# Patient Record
Sex: FEMALE | Race: WHITE | Employment: FULL TIME | URBAN - METROPOLITAN AREA
[De-identification: names, ages, dates, MRNs, and addresses within clinical notes are randomized per-mention and may not be internally consistent; named-entity substitution may affect disease eponyms.]

---

## 2017-01-04 DIAGNOSIS — R05.9 COUGH: Primary | ICD-10-CM

## 2017-01-09 DIAGNOSIS — R07.81 RIB PAIN ON LEFT SIDE: Primary | ICD-10-CM

## 2017-03-22 RX ORDER — CIPROFLOXACIN HYDROCHLORIDE 3.5 MG/ML
1 SOLUTION/ DROPS TOPICAL
Qty: 10 ML | Refills: 0 | Status: SHIPPED | OUTPATIENT
Start: 2017-03-22 | End: 2017-04-01

## 2017-05-12 RX ORDER — PREDNISONE 20 MG/1
20 TABLET ORAL
Qty: 30 TAB | Refills: 0 | Status: SHIPPED | OUTPATIENT
Start: 2017-05-12 | End: 2017-12-20 | Stop reason: SDUPTHER

## 2017-05-12 RX ORDER — AZITHROMYCIN 250 MG/1
TABLET, FILM COATED ORAL
Qty: 6 TAB | Refills: 0 | Status: SHIPPED | OUTPATIENT
Start: 2017-05-12 | End: 2017-05-17

## 2017-08-02 RX ORDER — SUMATRIPTAN 100 MG/1
100 TABLET, FILM COATED ORAL
Qty: 10 TAB | Refills: 3 | Status: SHIPPED | OUTPATIENT
Start: 2017-08-02 | End: 2019-04-22 | Stop reason: SDUPTHER

## 2017-08-10 ENCOUNTER — HOSPITAL ENCOUNTER (OUTPATIENT)
Dept: MRI IMAGING | Age: 62
Discharge: HOME | End: 2017-08-10
Attending: OTOLARYNGOLOGY
Payer: COMMERCIAL

## 2017-08-10 VITALS — BODY MASS INDEX: 25.61 KG/M2 | WEIGHT: 140 LBS

## 2017-08-10 DIAGNOSIS — H90.42 SENSORINEURAL HEARING LOSS (SNHL) OF LEFT EAR WITH UNRESTRICTED HEARING OF RIGHT EAR: ICD-10-CM

## 2017-08-10 DIAGNOSIS — R42 DIZZINESS: ICD-10-CM

## 2017-08-10 DIAGNOSIS — Z78.9 NONSMOKER: ICD-10-CM

## 2017-08-10 DIAGNOSIS — H93.12 TINNITUS, LEFT: ICD-10-CM

## 2017-08-10 DIAGNOSIS — J06.9 UPPER RESPIRATORY TRACT INFECTION, UNSPECIFIED TYPE: Primary | ICD-10-CM

## 2017-08-10 PROCEDURE — 74011250636 HC RX REV CODE- 250/636: Performed by: OTOLARYNGOLOGY

## 2017-08-10 PROCEDURE — 70553 MRI BRAIN STEM W/O & W/DYE: CPT

## 2017-08-10 PROCEDURE — A9577 INJ MULTIHANCE: HCPCS | Performed by: OTOLARYNGOLOGY

## 2017-08-10 RX ORDER — AZITHROMYCIN 250 MG/1
TABLET, FILM COATED ORAL
Qty: 6 TAB | Refills: 0 | Status: SHIPPED | OUTPATIENT
Start: 2017-08-10 | End: 2017-08-15

## 2017-08-10 RX ADMIN — GADOBENATE DIMEGLUMINE 13 ML: 529 INJECTION, SOLUTION INTRAVENOUS at 15:34

## 2017-11-13 DIAGNOSIS — J01.00 ACUTE MAXILLARY SINUSITIS, RECURRENCE NOT SPECIFIED: Primary | ICD-10-CM

## 2017-11-13 RX ORDER — AMOXICILLIN 875 MG/1
875 TABLET, FILM COATED ORAL 2 TIMES DAILY
Qty: 20 TAB | Refills: 0 | Status: SHIPPED | OUTPATIENT
Start: 2017-11-13 | End: 2017-11-23

## 2017-12-20 RX ORDER — PREDNISONE 20 MG/1
20 TABLET ORAL
Qty: 30 TAB | Refills: 0 | Status: SHIPPED | OUTPATIENT
Start: 2017-12-20 | End: 2018-10-01 | Stop reason: ALTCHOICE

## 2018-02-07 ENCOUNTER — HOSPITAL ENCOUNTER (OUTPATIENT)
Dept: GENERAL RADIOLOGY | Age: 63
Discharge: HOME | End: 2018-02-07
Payer: COMMERCIAL

## 2018-02-07 DIAGNOSIS — R05.9 COUGH: ICD-10-CM

## 2018-02-07 PROCEDURE — 71046 X-RAY EXAM CHEST 2 VIEWS: CPT

## 2018-05-21 ENCOUNTER — TELEPHONE (OUTPATIENT)
Dept: FAMILY MEDICINE CLINIC | Age: 63
End: 2018-05-21

## 2018-05-21 DIAGNOSIS — M54.9 UPPER BACK PAIN: Primary | ICD-10-CM

## 2018-05-21 RX ORDER — LIDOCAINE 50 MG/G
PATCH TOPICAL
Qty: 30 EACH | Refills: 2 | Status: SHIPPED | OUTPATIENT
Start: 2018-05-21 | End: 2019-04-22 | Stop reason: ALTCHOICE

## 2018-05-21 NOTE — TELEPHONE ENCOUNTER
PA initiated for Lidocaine Patch, case ID [id number]. Lidocaine was denied coverage, rationale letter put in in-basket.

## 2018-09-24 ENCOUNTER — APPOINTMENT (OUTPATIENT)
Dept: CT IMAGING | Age: 63
End: 2018-09-24
Attending: PHYSICIAN ASSISTANT
Payer: OTHER MISCELLANEOUS

## 2018-09-24 ENCOUNTER — HOSPITAL ENCOUNTER (EMERGENCY)
Age: 63
Discharge: HOME | End: 2018-09-24
Attending: EMERGENCY MEDICINE | Admitting: EMERGENCY MEDICINE
Payer: OTHER MISCELLANEOUS

## 2018-09-24 VITALS
HEART RATE: 78 BPM | WEIGHT: 140 LBS | SYSTOLIC BLOOD PRESSURE: 140 MMHG | BODY MASS INDEX: 25.76 KG/M2 | OXYGEN SATURATION: 97 % | RESPIRATION RATE: 16 BRPM | DIASTOLIC BLOOD PRESSURE: 68 MMHG | TEMPERATURE: 98.2 F | HEIGHT: 62 IN

## 2018-09-24 DIAGNOSIS — S01.112A LACERATION OF LEFT EYEBROW, INITIAL ENCOUNTER: ICD-10-CM
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
## 2018-09-24 PROCEDURE — 90715 TDAP VACCINE 7 YRS/> IM: CPT | Performed by: PHYSICIAN ASSISTANT

## 2018-09-24 PROCEDURE — 77030018836 HC SOL IRR NACL ICUM -A

## 2018-09-24 PROCEDURE — 75810000293 HC SIMP/SUPERF WND  RPR

## 2018-09-24 PROCEDURE — 74011250637 HC RX REV CODE- 250/637: Performed by: PHYSICIAN ASSISTANT

## 2018-09-24 PROCEDURE — 70486 CT MAXILLOFACIAL W/O DYE: CPT

## 2018-09-24 PROCEDURE — 74011250636 HC RX REV CODE- 250/636: Performed by: PHYSICIAN ASSISTANT

## 2018-09-24 PROCEDURE — 99283 EMERGENCY DEPT VISIT LOW MDM: CPT

## 2018-09-24 PROCEDURE — 77030039266 HC ADH SKN EXOFIN S2SG -A

## 2018-09-24 PROCEDURE — 70450 CT HEAD/BRAIN W/O DYE: CPT

## 2018-09-24 PROCEDURE — 90471 IMMUNIZATION ADMIN: CPT

## 2018-09-24 RX ORDER — HYDROCODONE BITARTRATE AND ACETAMINOPHEN 5; 325 MG/1; MG/1
1 TABLET ORAL
Status: COMPLETED | OUTPATIENT
Start: 2018-09-24 | End: 2018-09-24

## 2018-09-24 RX ADMIN — HYDROCODONE BITARTRATE AND ACETAMINOPHEN 1 TABLET: 5; 325 TABLET ORAL at 19:40

## 2018-09-24 RX ADMIN — TETANUS TOXOID, REDUCED DIPHTHERIA TOXOID AND ACELLULAR PERTUSSIS VACCINE, ADSORBED 0.5 ML: 5; 2.5; 8; 8; 2.5 SUSPENSION INTRAMUSCULAR at 19:40

## 2018-09-24 NOTE — ED NOTES
Patient has been medically cleared for discharge at this time. She was given all discharge instructions by Bree Hankins. Patient verbalized understanding of all education. Seen leaving the department with a steady gait and all belongings.

## 2018-09-24 NOTE — DISCHARGE INSTRUCTIONS
Cuts Closed With Adhesives: Care Instructions  Your Care Instructions  A cut can happen anywhere on your body. The doctor used an adhesive to close the cut. When the adhesive dries, it forms a film that holds the edges of the cut together. Skin adhesives are sometimes called liquid stitches. If the cut went deep and through the skin, the doctor may have put in a layer of stitches below the adhesive. The deeper layer of stitches brings the deep part of the cut together. These stitches will dissolve and don't need to be removed. You don't see the stitches, only the adhesive. You may have a bandage. The doctor has checked you carefully, but problems can develop later. If you notice any problems or new symptoms, get medical treatment right away. Follow-up care is a key part of your treatment and safety. Be sure to make and go to all appointments, and call your doctor if you are having problems. It's also a good idea to know your test results and keep a list of the medicines you take. How can you care for yourself at home? · Keep the cut dry for the first 24 to 48 hours. After this, you can shower if your doctor okays it. Pat the cut dry. · Don't soak the cut, such as in a bathtub. Your doctor will tell you when it's safe to get the cut wet. · If your doctor told you how to care for your cut, follow your doctor's instructions. If you did not get instructions, follow this general advice:  ¨ Do not put any kind of ointment, cream, or lotion over the area. This can make the adhesive fall off too soon. ¨ After the first 24 to 48 hours, wash around the cut with clean water 2 times a day. Do not use hydrogen peroxide or alcohol, which can slow healing. ¨ If the doctor told you to use a bandage, put on a new bandage after cleaning the cut or if the bandage gets wet or dirty. · Prop up the sore area on a pillow anytime you sit or lie down during the next 3 days. Try to keep it above the level of your heart. This will help reduce swelling. · Leave the skin adhesive on your skin until it falls off on its own. This may take 5 to 10 days. · Do not scratch, rub, or pick at the adhesive. · Do not put the sticky part of a bandage directly on the adhesive. · Avoid any activity that could cause your cut to reopen. · Be safe with medicines. Read and follow all instructions on the label. ¨ If the doctor gave you a prescription medicine for pain, take it as prescribed. ¨ If you are not taking a prescription pain medicine, ask your doctor if you can take an over-the-counter medicine. When should you call for help? Call your doctor now or seek immediate medical care if:    · You have new pain, or your pain gets worse.     · The skin near the cut is cold or pale or changes color.     · You have tingling, weakness, or numbness near the cut.     · The cut starts to bleed.     · You have trouble moving the area near the cut.     · You have symptoms of infection, such as:  ¨ Increased pain, swelling, warmth, or redness around the cut. ¨ Red streaks leading from the cut. ¨ Pus draining from the cut. ¨ A fever.    Watch closely for changes in your health, and be sure to contact your doctor if:    · The cut reopens.     · You do not get better as expected. Where can you learn more? Go to http://nickolas-divya.info/. Enter P174 in the search box to learn more about \"Cuts Closed With Adhesives: Care Instructions. \"  Current as of: November 20, 2017  Content Version: 11.7  © 6343-6749 [employer]. Care instructions adapted under license by [employer] (which disclaims liability or warranty for this information). If you have questions about a medical condition or this instruction, always ask your healthcare professional. [address] any warranty or liability for your use of this information.

## 2018-09-24 NOTE — ED PROVIDER NOTES
HPI Comments: 57 y.o. female with past medical history significant for asthma who presents from work with chief complaint of facial pain and swelling s/p GLF. The patient states she tripped and fell this afternoon onto a tile floor, sustained a laceration to her left eyebrow. The patient reports left sided facial pain, swelling, and bruising since, as well as some left hand pain from catching herself. She states her coworkers helped her up. The patient states her last tetanus shot was in . She denies any anticoagulant use. She denies any LOC, neck pain, back pain, fever, chest pain, SOB, nausea, vomiting, weakness, or numbness. There are no other acute medical concerns at this time. Social hx: Nonsmoker; No EtOH use PCP: Jamaica Ramos NP Note written by Moriah Capellan, as dictated by Kiran Rivera PA-C 6:45 PM 
 
The history is provided by the patient. No  was used. Past Medical History:  
Diagnosis Date  Asthma  Triad asthma 2009 Past Surgical History:  
Procedure Laterality Date  [facility]   HX HEENT    
 nasal polyp  HX ORTHOPAEDIC    
 back surgery X3 Family History:  
Problem Relation Age of Onset  Asthma Father  Hypertension Father Social History Social History  Marital status:  Spouse name: N/A  
 Number of children: N/A  
 Years of education: N/A Occupational History  Not on file. Social History Main Topics  Smoking status: Never Smoker  Smokeless tobacco: Never Used  Alcohol use No  
 Drug use: No  
 Sexual activity: Not on file Comment:  Other Topics Concern  Not on file Social History Narrative ALLERGIES: Aspirin; Nsaids (non-steroidal anti-inflammatory drug); and Singulair [montelukast] Review of Systems Constitutional: Negative for chills and fever. HENT: Positive for facial swelling. (+) Facial pain Eyes: Negative for visual disturbance. Respiratory: Negative for shortness of breath. Cardiovascular: Negative for chest pain. Gastrointestinal: Negative for nausea and vomiting. Musculoskeletal: Negative for back pain and neck pain. Skin: Positive for color change and wound. Neurological: Negative for syncope, weakness, light-headedness and numbness. All other systems reviewed and are negative. Vitals:  
 09/24/18 1749 BP: 153/78 Pulse: 93 Resp: 16 Temp: 98.3 °F (36.8 °C) SpO2: 98% Weight: 63.5 kg (140 lb) Height: 5' 2\" (1.575 m) Physical Exam  
Constitutional: She is oriented to person, place, and time. She appears well-developed and well-nourished. No distress. HENT:  
Head: Normocephalic. Left eyebrow with superificial laceration approx 2 cm long, skin edges well approximated, no active bleeding, with underlying contusion. Left maxillary face with swelling, mild ecchymosis, and tenderness to palpation. No nasal or frontal tenderness. No malocclusion of jaw. Eyes: Conjunctivae and EOM are normal. Pupils are equal, round, and reactive to light. Neck: Normal range of motion. Neck supple. Cardiovascular: Normal rate. Pulmonary/Chest: Effort normal. No respiratory distress. Musculoskeletal: No midline tenderness of cervical, thoracic or lumbar spine. Left hand with mild tenderness of thenar eminence, no bony tenderness. No ecchymosis or deformity. Fingers are warm with intact light touch sensation, full active ROM. Neurological: She is alert and oriented to person, place, and time. CN 2-12 intact. 5/5 strength of bilateral upper and lower extremities, no sensory deficits. Ambulatory with steady gait. Skin: Skin is warm and dry. She is not diaphoretic. Nursing note and vitals reviewed. MDM Number of Diagnoses or Management Options Fall, initial encounter:  
Laceration of left eyebrow, initial encounter: Amount and/or Complexity of Data Reviewed Tests in the radiology section of CPT®: ordered and reviewed Patient Progress Patient progress: stable ED Course Wound Closure by Adhesive Date/Time: 9/24/2018 7:39 PM 
Performed by: John Barba Authorized by: John Barba Consent:  
  Consent obtained:  Verbal 
  Consent given by:  Patient Laceration details: Location:  Face Face location:  L eyebrow Length (cm):  2 Laceration depth: superficial. 
Repair type:  
  Repair type:  Simple Exploration:  
  Hemostasis achieved with:  Direct pressure Contaminated: no   
Treatment:  
  Area cleansed with:  Saline Amount of cleaning:  Standard Irrigation solution:  Sterile saline Skin repair:  
  Repair method:  Tissue adhesive Approximation:  
  Approximation:  Loose Post-procedure details:  
  Dressing:  Open (no dressing) Patient tolerance of procedure: Tolerated well, no immediate complications 57 y.o. female with past medical history significant for asthma who presents from work with chief complaint of facial pain and swelling s/p GLF. CT head and CT maxillofacial obtained and are negative for acute fractures or intracranial injuries. Low clinical suspicion for fractures/malalignment of left hand. Laceration repair performed, see procedure note above for details. Tdap administered. Safe for discharge home. PCP follow up as needed. Strict ED return precautions discussed and provided in writing at time of discharge. The patient verbalized understanding and agreement with this plan.

## 2018-09-24 NOTE — ED TRIAGE NOTES
Triage note: Pt arrives with c/o fall today. Pt tripped and hit her face. Laceration above left eyebrow and swelling to leg cheek. Denies LOC.

## 2018-10-01 ENCOUNTER — OFFICE VISIT (OUTPATIENT)
Dept: FAMILY MEDICINE CLINIC | Age: 63
End: 2018-10-01

## 2018-10-01 VITALS
OXYGEN SATURATION: 98 % | WEIGHT: 144.6 LBS | DIASTOLIC BLOOD PRESSURE: 61 MMHG | TEMPERATURE: 99.6 F | RESPIRATION RATE: 20 BRPM | HEART RATE: 82 BPM | BODY MASS INDEX: 26.61 KG/M2 | SYSTOLIC BLOOD PRESSURE: 126 MMHG | HEIGHT: 62 IN

## 2018-10-01 DIAGNOSIS — R07.81 RIB PAIN: Primary | ICD-10-CM

## 2018-10-01 RX ORDER — TIMOLOL MALEATE 2.5 MG/ML
1 SOLUTION/ DROPS OPHTHALMIC 2 TIMES DAILY
COMMUNITY

## 2018-10-01 RX ORDER — BUDESONIDE AND FORMOTEROL FUMARATE DIHYDRATE 160; 4.5 UG/1; UG/1
AEROSOL RESPIRATORY (INHALATION)
Refills: 12 | COMMUNITY
Start: 2018-09-18 | End: 2020-01-27 | Stop reason: SDUPTHER

## 2018-10-09 NOTE — PROGRESS NOTES
HISTORY OF PRESENT ILLNESS Kenyatta Brown is a 57 y.o. female. HPI This lady presents to the clinic for follow up of a fall. She was going to work on 9/24/18 when she tripped and landed face down. She was subsequently evaluated in the emergency room, where imaging studies were apparently normal.' She states she is feeling better and has actually returned to work. She states she has occasional pain in her right rib and wanted consideration of an xray to be sure she doesn't have any rib fractures. She denies CP or SOB Review of Systems Constitutional: Negative for chills and fever. Respiratory: Negative for cough, shortness of breath and wheezing. Cardiovascular: Negative for chest pain and palpitations. Gastrointestinal: Negative for abdominal pain. Musculoskeletal: Negative for myalgias. Physical Exam  
Constitutional: She appears well-developed and well-nourished. No distress. HENT:  
Right Ear: External ear normal.  
Left Ear: External ear normal.  
Nose: Nose normal.  
Mouth/Throat: Oropharynx is clear and moist. No oropharyngeal exudate. Left facial contusion-abrasions to face, healing Eyes: Pupils are equal, round, and reactive to light. Neck: Normal range of motion. Cardiovascular: Normal rate, regular rhythm and normal heart sounds. No murmur heard. Pulmonary/Chest: Effort normal and breath sounds normal. No respiratory distress. She has no wheezes. She has no rales. Abdominal: There is no tenderness. Skin: She is not diaphoretic. ASSESSMENT and PLAN 
  ICD-10-CM ICD-9-CM 1. Rib pain R07.81 786.50 XR RIBS RT UNI 2 V  
will get xray of right rib detail Continue current care Follow up 1 week

## 2019-01-08 DIAGNOSIS — J33.9 TRIAD ASTHMA: Primary | ICD-10-CM

## 2019-01-08 DIAGNOSIS — J45.909 TRIAD ASTHMA: Primary | ICD-10-CM

## 2019-01-08 DIAGNOSIS — Z88.6 TRIAD ASTHMA: Primary | ICD-10-CM

## 2019-01-08 RX ORDER — ALBUTEROL SULFATE 90 UG/1
2 AEROSOL, METERED RESPIRATORY (INHALATION)
Qty: 3 INHALER | Refills: 1 | Status: SHIPPED | OUTPATIENT
Start: 2019-01-08 | End: 2020-03-16

## 2019-02-11 DIAGNOSIS — B02.9 HERPES ZOSTER WITHOUT COMPLICATION: Primary | ICD-10-CM

## 2019-02-11 RX ORDER — VALACYCLOVIR HYDROCHLORIDE 1 G/1
1000 TABLET, FILM COATED ORAL 3 TIMES DAILY
Qty: 21 TAB | Refills: 0 | Status: SHIPPED | OUTPATIENT
Start: 2019-02-11 | End: 2022-04-24

## 2019-04-08 DIAGNOSIS — J06.9 UPPER RESPIRATORY TRACT INFECTION, UNSPECIFIED TYPE: Primary | ICD-10-CM

## 2019-04-08 DIAGNOSIS — J33.9 TRIAD ASTHMA: ICD-10-CM

## 2019-04-08 DIAGNOSIS — J45.909 TRIAD ASTHMA: ICD-10-CM

## 2019-04-08 DIAGNOSIS — Z88.6 TRIAD ASTHMA: ICD-10-CM

## 2019-04-08 RX ORDER — PREDNISONE 20 MG/1
TABLET ORAL
Qty: 30 TAB | Refills: 0 | Status: SHIPPED | OUTPATIENT
Start: 2019-04-08 | End: 2019-04-22 | Stop reason: ALTCHOICE

## 2019-04-08 RX ORDER — AZITHROMYCIN 250 MG/1
TABLET, FILM COATED ORAL
Qty: 6 TAB | Refills: 0 | Status: SHIPPED | OUTPATIENT
Start: 2019-04-08 | End: 2019-04-13

## 2019-04-22 ENCOUNTER — OFFICE VISIT (OUTPATIENT)
Dept: FAMILY MEDICINE CLINIC | Age: 64
End: 2019-04-22

## 2019-04-22 VITALS
BODY MASS INDEX: 24.84 KG/M2 | WEIGHT: 135 LBS | OXYGEN SATURATION: 99 % | TEMPERATURE: 98.3 F | DIASTOLIC BLOOD PRESSURE: 62 MMHG | HEART RATE: 76 BPM | HEIGHT: 62 IN | RESPIRATION RATE: 16 BRPM | SYSTOLIC BLOOD PRESSURE: 138 MMHG

## 2019-04-22 DIAGNOSIS — J33.9 TRIAD ASTHMA: ICD-10-CM

## 2019-04-22 DIAGNOSIS — E55.9 VITAMIN D DEFICIENCY: ICD-10-CM

## 2019-04-22 DIAGNOSIS — Z88.6 TRIAD ASTHMA: ICD-10-CM

## 2019-04-22 DIAGNOSIS — Z00.00 ROUTINE GENERAL MEDICAL EXAMINATION AT A HEALTH CARE FACILITY: Primary | ICD-10-CM

## 2019-04-22 DIAGNOSIS — G43.009 MIGRAINE WITHOUT AURA AND WITHOUT STATUS MIGRAINOSUS, NOT INTRACTABLE: ICD-10-CM

## 2019-04-22 DIAGNOSIS — J45.909 TRIAD ASTHMA: ICD-10-CM

## 2019-04-22 RX ORDER — SUMATRIPTAN 100 MG/1
100 TABLET, FILM COATED ORAL
Qty: 10 TAB | Refills: 3 | Status: SHIPPED | OUTPATIENT
Start: 2019-04-22 | End: 2019-04-22

## 2019-04-22 NOTE — PROGRESS NOTES
HISTORY OF PRESENT ILLNESS  Sheeba Hernandez is a 70 y.o. female. HPI  Presents for CPE. Follows healthy diet, inconsistent with regular exercise. History of asthma. Using inhalers as prescribed. History of migraine headaches. Using imitrex occasionally with good sx relief. Declines screening colonoscopy. Past Medical History:   Diagnosis Date    Asthma     Triad asthma 2009     Patient Active Problem List   Diagnosis Code    Triad asthma J45.909, J33.9, Z88.6    Migraine without aura and without status migrainosus, not intractable G43.009     Past Surgical History:   Procedure Laterality Date    HX GYN  1984        HX HEENT      nasal polyp    HX ORTHOPAEDIC      back surgery X3     Current Outpatient Medications   Medication Sig    SUMAtriptan (IMITREX) 100 mg tablet Take 1 Tab by mouth once as needed for Migraine for up to 1 dose.  albuterol (PROVENTIL HFA, VENTOLIN HFA, PROAIR HFA) 90 mcg/actuation inhaler Take 2 Puffs by inhalation every four (4) hours as needed for Wheezing.  SYMBICORT 160-4.5 mcg/actuation HFAA INHALE 2 PUFFS BY MOUTH TWICE A DAY (RINSE MOUTH AFTER USE)    timolol (TIMOPTIC) 0.25 % ophthalmic solution Administer 1 Drop to both eyes two (2) times a day.  valACYclovir (VALTREX) 1 gram tablet Take 1 Tab by mouth three (3) times daily.  celecoxib (CELEBREX) 200 mg capsule Take 1 Cap by mouth two (2) times a day. No current facility-administered medications for this visit. Social History     Tobacco Use    Smoking status: Never Smoker    Smokeless tobacco: Never Used   Substance Use Topics    Alcohol use: No    Drug use: No     Visit Vitals  /62   Pulse 76   Temp 98.3 °F (36.8 °C) (Oral)   Resp 16   Ht 5' 2\" (1.575 m)   Wt 135 lb (61.2 kg)   SpO2 99%   BMI 24.69 kg/m²     Review of Systems   Constitutional: Negative. Respiratory: Negative for cough and shortness of breath.     Cardiovascular: Negative for chest pain and palpitations. Neurological: Positive for headaches (occasional). All other systems reviewed and are negative. Physical Exam   Constitutional: No distress. Neck: Neck supple. Cardiovascular: Normal rate, regular rhythm and normal heart sounds. Pulmonary/Chest: Effort normal and breath sounds normal.   Abdominal: Soft. She exhibits no distension. There is no tenderness. There is no guarding. Musculoskeletal: She exhibits no edema. Lymphadenopathy:     She has no cervical adenopathy. Neurological: She is alert. No cranial nerve deficit. Skin: Skin is warm and dry. Psychiatric: She has a normal mood and affect. ASSESSMENT and PLAN  Diagnoses and all orders for this visit:    1. Routine general medical examination at a health care facility  -     CBC W/O DIFF  -     METABOLIC PANEL, COMPREHENSIVE  -     LIPID PANEL  -     TSH 3RD GENERATION  -     MS HANDLG&/OR CONVEY OF SPEC FOR TR OFFICE TO LAB  -     COLLECTION VENOUS BLOOD,VENIPUNCTURE  Age appropriate health maintenance and prevention reviewed. Follow healthy diet and exercise regularly at least 4-5x weekly. She will return for fasting labs. 2. Migraine without aura and without status migrainosus, not intractable  -     SUMAtriptan (IMITREX) 100 mg tablet; Take 1 Tab by mouth once as needed for Migraine for up to 1 dose. Stable on prn imitrex. Medication risks, benefits and potential side effects were reviewed. 3. Triad asthma  Stable on current treatment. Follow up 1 year or sooner prn.

## 2019-04-22 NOTE — PROGRESS NOTES
Chief Complaint   Patient presents with    Complete Physical     Yearly Physical.     1. Have you been to the ER, urgent care clinic since your last visit? Hospitalized since your last visit? No    2. Have you seen or consulted any other health care providers outside of the [address] [place] [address] since your last visit? Include any pap smears or colon screening.  No

## 2019-04-29 DIAGNOSIS — Z00.00 ROUTINE GENERAL MEDICAL EXAMINATION AT A HEALTH CARE FACILITY: Primary | ICD-10-CM

## 2019-05-05 DIAGNOSIS — J33.9 TRIAD ASTHMA: ICD-10-CM

## 2019-05-05 DIAGNOSIS — Z88.6 TRIAD ASTHMA: ICD-10-CM

## 2019-05-05 DIAGNOSIS — J45.909 TRIAD ASTHMA: ICD-10-CM

## 2019-05-06 RX ORDER — PREDNISONE 20 MG/1
TABLET ORAL
Qty: 30 TAB | Refills: 0 | Status: SHIPPED | OUTPATIENT
Start: 2019-05-06 | End: 2022-04-24

## 2019-09-04 LAB
25(OH)D3+25(OH)D2 SERPL-MCNC: 11.2 NG/ML (ref 30–100)
ALBUMIN SERPL-MCNC: 4.5 G/DL (ref 3.6–4.8)
ALBUMIN/GLOB SERPL: 1.8 {RATIO} (ref 1.2–2.2)
ALP SERPL-CCNC: 90 IU/L (ref 39–117)
ALT SERPL-CCNC: 25 IU/L (ref 0–32)
AST SERPL-CCNC: 22 IU/L (ref 0–40)
BILIRUB SERPL-MCNC: 0.3 MG/DL (ref 0–1.2)
BUN SERPL-MCNC: 12 MG/DL (ref 8–27)
BUN/CREAT SERPL: 23 (ref 12–28)
CALCIUM SERPL-MCNC: 9.8 MG/DL (ref 8.7–10.3)
CHLORIDE SERPL-SCNC: 103 MMOL/L (ref 96–106)
CHOLEST SERPL-MCNC: 190 MG/DL (ref 100–199)
CO2 SERPL-SCNC: 24 MMOL/L (ref 20–29)
CREAT SERPL-MCNC: 0.53 MG/DL (ref 0.57–1)
ERYTHROCYTE [DISTWIDTH] IN BLOOD BY AUTOMATED COUNT: 14.6 % (ref 12.3–15.4)
GLOBULIN SER CALC-MCNC: 2.5 G/DL (ref 1.5–4.5)
GLUCOSE SERPL-MCNC: 99 MG/DL (ref 65–99)
HCT VFR BLD AUTO: 42.7 % (ref 34–46.6)
HDLC SERPL-MCNC: 50 MG/DL
HGB BLD-MCNC: 13.8 G/DL (ref 11.1–15.9)
INTERPRETATION, 910389: NORMAL
LDLC SERPL CALC-MCNC: 124 MG/DL (ref 0–99)
MCH RBC QN AUTO: 26.4 PG (ref 26.6–33)
MCHC RBC AUTO-ENTMCNC: 32.3 G/DL (ref 31.5–35.7)
MCV RBC AUTO: 82 FL (ref 79–97)
PLATELET # BLD AUTO: 370 X10E3/UL (ref 150–450)
POTASSIUM SERPL-SCNC: 5.1 MMOL/L (ref 3.5–5.2)
PROT SERPL-MCNC: 7 G/DL (ref 6–8.5)
RBC # BLD AUTO: 5.22 X10E6/UL (ref 3.77–5.28)
SODIUM SERPL-SCNC: 143 MMOL/L (ref 134–144)
TRIGL SERPL-MCNC: 81 MG/DL (ref 0–149)
TSH SERPL DL<=0.005 MIU/L-ACNC: 2.87 UIU/ML (ref 0.45–4.5)
VIT B12 SERPL-MCNC: 1350 PG/ML (ref 232–1245)
VLDLC SERPL CALC-MCNC: 16 MG/DL (ref 5–40)
WBC # BLD AUTO: 7.9 X10E3/UL (ref 3.4–10.8)

## 2020-01-27 DIAGNOSIS — Z88.6 TRIAD ASTHMA: Primary | ICD-10-CM

## 2020-01-27 DIAGNOSIS — J45.909 TRIAD ASTHMA: Primary | ICD-10-CM

## 2020-01-27 DIAGNOSIS — J33.9 TRIAD ASTHMA: Primary | ICD-10-CM

## 2020-01-27 RX ORDER — BUDESONIDE AND FORMOTEROL FUMARATE DIHYDRATE 160; 4.5 UG/1; UG/1
AEROSOL RESPIRATORY (INHALATION)
Qty: 3 INHALER | Refills: 4 | Status: SHIPPED | OUTPATIENT
Start: 2020-01-27 | End: 2020-12-07 | Stop reason: SDUPTHER

## 2020-02-13 DIAGNOSIS — J06.9 UPPER RESPIRATORY TRACT INFECTION, UNSPECIFIED TYPE: Primary | ICD-10-CM

## 2020-02-13 RX ORDER — AZITHROMYCIN 250 MG/1
TABLET, FILM COATED ORAL
Qty: 6 TAB | Refills: 0 | Status: SHIPPED | OUTPATIENT
Start: 2020-02-13 | End: 2020-02-18

## 2020-03-14 DIAGNOSIS — Z88.6 TRIAD ASTHMA: ICD-10-CM

## 2020-03-14 DIAGNOSIS — J45.909 TRIAD ASTHMA: ICD-10-CM

## 2020-03-14 DIAGNOSIS — J33.9 TRIAD ASTHMA: ICD-10-CM

## 2020-03-16 RX ORDER — ALBUTEROL SULFATE 90 UG/1
AEROSOL, METERED RESPIRATORY (INHALATION)
Qty: 8.5 INHALER | Refills: 5 | Status: SHIPPED | OUTPATIENT
Start: 2020-03-16 | End: 2020-04-27 | Stop reason: SDUPTHER

## 2020-04-03 DIAGNOSIS — J06.9 UPPER RESPIRATORY TRACT INFECTION, UNSPECIFIED TYPE: Primary | ICD-10-CM

## 2020-04-03 RX ORDER — AZITHROMYCIN 250 MG/1
TABLET, FILM COATED ORAL
Qty: 6 TAB | Refills: 0 | Status: SHIPPED | OUTPATIENT
Start: 2020-04-03 | End: 2020-04-08

## 2020-04-27 DIAGNOSIS — J45.909 TRIAD ASTHMA: ICD-10-CM

## 2020-04-27 DIAGNOSIS — J33.9 TRIAD ASTHMA: ICD-10-CM

## 2020-04-27 DIAGNOSIS — Z88.6 TRIAD ASTHMA: ICD-10-CM

## 2020-04-27 DIAGNOSIS — M54.2 NECK PAIN: ICD-10-CM

## 2020-04-27 RX ORDER — ALBUTEROL SULFATE 90 UG/1
AEROSOL, METERED RESPIRATORY (INHALATION)
Qty: 3 INHALER | Refills: 1 | Status: SHIPPED | OUTPATIENT
Start: 2020-04-27

## 2020-04-27 RX ORDER — CELECOXIB 200 MG/1
200 CAPSULE ORAL 2 TIMES DAILY
Qty: 30 CAP | Refills: 1 | Status: SHIPPED | OUTPATIENT
Start: 2020-04-27 | End: 2020-05-26

## 2020-05-07 DIAGNOSIS — Z20.828 SARS-ASSOCIATED CORONAVIRUS EXPOSURE: Primary | ICD-10-CM

## 2020-05-12 LAB — SARS-COV-2 AB, IGG, CORG1M: NEGATIVE

## 2020-05-23 DIAGNOSIS — M54.2 NECK PAIN: ICD-10-CM

## 2020-05-26 RX ORDER — CELECOXIB 200 MG/1
CAPSULE ORAL
Qty: 30 CAP | Refills: 1 | Status: SHIPPED | OUTPATIENT
Start: 2020-05-26 | End: 2022-04-24

## 2020-06-22 DIAGNOSIS — R10.13 DYSPEPSIA: Primary | ICD-10-CM

## 2020-06-22 RX ORDER — ESOMEPRAZOLE MAGNESIUM 20 MG/1
TABLET, DELAYED RELEASE ORAL
Qty: 90 TAB | Refills: 0 | Status: SHIPPED | OUTPATIENT
Start: 2020-06-22 | End: 2020-09-15

## 2020-07-23 RX ORDER — SUMATRIPTAN 100 MG/1
TABLET, FILM COATED ORAL
Qty: 10 TAB | Refills: 3 | Status: SHIPPED | OUTPATIENT
Start: 2020-07-23

## 2020-07-23 NOTE — TELEPHONE ENCOUNTER
Tyrese Sumner,    For TYRESE Diggs. This rx was not on current med list but had been d/c'd possibly due to \"one dose\" entry. Request for new rx. Re-entered new rx if appropriate. Please review. Thanks, Jayleen Majano    Last Visit: 4/22/19  TYRESE Sumner  Next Appointment: Not scheduled  Previous Refill Encounter(s): 4/22/19 10 + 3    Requested Prescriptions     Pending Prescriptions Disp Refills    SUMAtriptan (IMITREX) 100 mg tablet 10 Tab 3     Sig: Take 1 tab by mouth once as needed for migraine for up to 1 dose.

## 2020-08-19 ENCOUNTER — EMPLOYEE WELLNESS (OUTPATIENT)
Dept: OTHER | Facility: CLINIC | Age: 65
End: 2020-08-19

## 2020-08-20 LAB
CHOLEST SERPL-MCNC: 171 MG/DL
CHOLEST SERPL-MCNC: NORMAL MG/DL
EST. AVERAGE GLUCOSE BLD GHB EST-MCNC: 126 MG/DL
GLUCOSE SERPL-MCNC: 139 MG/DL (ref 65–100)
GLUCOSE SERPL-MCNC: NORMAL MG/DL
HBA1C MFR BLD: 6 % (ref 4–5.6)
HDLC SERPL-MCNC: 50 MG/DL
HDLC SERPL-MCNC: NORMAL MG/DL
LDLC SERPL CALC-MCNC: 107.8 MG/DL (ref 0–100)
LDLC SERPL CALC-MCNC: NORMAL MG/DL
TRIGL SERPL-MCNC: 66 MG/DL (ref ?–150)
TRIGL SERPL-MCNC: NORMAL MG/DL (ref ?–150)

## 2020-09-15 DIAGNOSIS — R10.13 DYSPEPSIA: ICD-10-CM

## 2020-09-15 RX ORDER — HYDROGEN PEROXIDE 3 %
SOLUTION, NON-ORAL MISCELLANEOUS
Qty: 90 CAP | Refills: 0 | Status: SHIPPED | OUTPATIENT
Start: 2020-09-15 | End: 2022-04-24

## 2020-10-15 DIAGNOSIS — J06.9 UPPER RESPIRATORY TRACT INFECTION, UNSPECIFIED TYPE: Primary | ICD-10-CM

## 2020-10-15 RX ORDER — AZITHROMYCIN 250 MG/1
TABLET, FILM COATED ORAL
Qty: 6 TAB | Refills: 0 | Status: SHIPPED | OUTPATIENT
Start: 2020-10-15 | End: 2020-10-20

## 2020-12-04 ENCOUNTER — TELEPHONE (OUTPATIENT)
Dept: FAMILY MEDICINE CLINIC | Age: 65
End: 2020-12-04

## 2020-12-07 DIAGNOSIS — Z88.6 TRIAD ASTHMA: ICD-10-CM

## 2020-12-07 DIAGNOSIS — J33.9 TRIAD ASTHMA: ICD-10-CM

## 2020-12-07 DIAGNOSIS — J45.909 TRIAD ASTHMA: ICD-10-CM

## 2020-12-07 RX ORDER — BUDESONIDE AND FORMOTEROL FUMARATE DIHYDRATE 160; 4.5 UG/1; UG/1
AEROSOL RESPIRATORY (INHALATION)
Qty: 3 INHALER | Refills: 3 | Status: SHIPPED | OUTPATIENT
Start: 2020-12-07 | End: 2022-03-28

## 2021-01-27 DIAGNOSIS — M79.2 NEUROPATHIC PAIN: Primary | ICD-10-CM

## 2021-01-27 RX ORDER — LIDOCAINE 50 MG/G
PATCH TOPICAL
Qty: 1 EACH | Refills: 1 | Status: SHIPPED | OUTPATIENT
Start: 2021-01-27 | End: 2022-09-12 | Stop reason: SDUPTHER

## 2021-05-26 DIAGNOSIS — J01.00 ACUTE MAXILLARY SINUSITIS, RECURRENCE NOT SPECIFIED: Primary | ICD-10-CM

## 2021-05-26 RX ORDER — AZITHROMYCIN 250 MG/1
TABLET, FILM COATED ORAL
Qty: 6 TABLET | Refills: 0 | Status: SHIPPED | OUTPATIENT
Start: 2021-05-26 | End: 2021-05-31

## 2022-03-18 PROBLEM — G43.009 MIGRAINE WITHOUT AURA AND WITHOUT STATUS MIGRAINOSUS, NOT INTRACTABLE: Status: ACTIVE | Noted: 2019-04-22

## 2022-04-19 ENCOUNTER — TELEPHONE (OUTPATIENT)
Dept: FAMILY MEDICINE CLINIC | Age: 67
End: 2022-04-19

## 2022-04-19 RX ORDER — DICYCLOMINE HYDROCHLORIDE 10 MG/1
10 CAPSULE ORAL 4 TIMES DAILY
Qty: 30 CAPSULE | Refills: 1 | Status: SHIPPED | OUTPATIENT
Start: 2022-04-19 | End: 2022-04-29

## 2022-08-25 DIAGNOSIS — M54.9 UPPER BACK PAIN: Primary | ICD-10-CM

## 2022-08-25 RX ORDER — PREDNISONE 20 MG/1
TABLET ORAL
Qty: 30 TABLET | Refills: 0 | Status: SHIPPED | OUTPATIENT
Start: 2022-08-25

## 2022-09-12 DIAGNOSIS — M79.2 NEUROPATHIC PAIN: ICD-10-CM

## 2022-09-12 RX ORDER — LIDOCAINE 50 MG/G
PATCH TOPICAL
Qty: 30 EACH | Refills: 1 | Status: SHIPPED | OUTPATIENT
Start: 2022-09-12

## 2023-01-16 DIAGNOSIS — M54.9 UPPER BACK PAIN: ICD-10-CM

## 2023-01-16 DIAGNOSIS — J45.909 TRIAD ASTHMA: Primary | ICD-10-CM

## 2023-01-16 DIAGNOSIS — J33.9 TRIAD ASTHMA: Primary | ICD-10-CM

## 2023-01-16 DIAGNOSIS — Z88.6 TRIAD ASTHMA: Primary | ICD-10-CM

## 2023-01-16 RX ORDER — PREDNISONE 20 MG/1
TABLET ORAL
Qty: 30 TABLET | Refills: 0 | Status: SHIPPED | OUTPATIENT
Start: 2023-01-16

## 2023-02-13 DIAGNOSIS — J33.9 TRIAD ASTHMA: ICD-10-CM

## 2023-02-13 DIAGNOSIS — Z88.6 TRIAD ASTHMA: ICD-10-CM

## 2023-02-13 DIAGNOSIS — J45.909 TRIAD ASTHMA: ICD-10-CM

## 2023-02-13 RX ORDER — BUDESONIDE AND FORMOTEROL FUMARATE DIHYDRATE 160; 4.5 UG/1; UG/1
AEROSOL RESPIRATORY (INHALATION)
Qty: 30.6 G | Refills: 3 | Status: SHIPPED | OUTPATIENT
Start: 2023-02-13

## 2023-02-14 DIAGNOSIS — J45.909 TRIAD ASTHMA: ICD-10-CM

## 2023-02-14 DIAGNOSIS — J33.9 TRIAD ASTHMA: ICD-10-CM

## 2023-02-14 DIAGNOSIS — Z88.6 TRIAD ASTHMA: ICD-10-CM

## 2023-02-16 DIAGNOSIS — J06.9 UPPER RESPIRATORY TRACT INFECTION, UNSPECIFIED TYPE: Primary | ICD-10-CM

## 2023-02-16 RX ORDER — AZITHROMYCIN 250 MG/1
TABLET, FILM COATED ORAL
Qty: 6 TABLET | Refills: 0 | Status: SHIPPED | OUTPATIENT
Start: 2023-02-16 | End: 2023-02-21

## 2023-02-27 ENCOUNTER — TELEPHONE (OUTPATIENT)
Dept: FAMILY MEDICINE CLINIC | Age: 68
End: 2023-02-27

## 2023-02-27 DIAGNOSIS — G43.009 MIGRAINE WITHOUT AURA AND WITHOUT STATUS MIGRAINOSUS, NOT INTRACTABLE: Primary | ICD-10-CM

## 2023-02-27 DIAGNOSIS — Z88.6 TRIAD ASTHMA: ICD-10-CM

## 2023-02-27 DIAGNOSIS — J33.9 TRIAD ASTHMA: ICD-10-CM

## 2023-02-27 DIAGNOSIS — J45.909 TRIAD ASTHMA: ICD-10-CM

## 2023-02-27 DIAGNOSIS — J06.9 UPPER RESPIRATORY TRACT INFECTION, UNSPECIFIED TYPE: ICD-10-CM

## 2023-02-27 RX ORDER — AZITHROMYCIN 250 MG/1
TABLET, FILM COATED ORAL
Qty: 6 TABLET | Refills: 0 | Status: SHIPPED | OUTPATIENT
Start: 2023-02-27 | End: 2023-03-04

## 2023-02-27 RX ORDER — PREDNISONE 20 MG/1
TABLET ORAL
Qty: 30 TABLET | Refills: 1 | Status: SHIPPED | OUTPATIENT
Start: 2023-02-27

## 2023-02-27 RX ORDER — SUMATRIPTAN 100 MG/1
TABLET, FILM COATED ORAL
Qty: 10 TABLET | Refills: 3 | Status: SHIPPED | OUTPATIENT
Start: 2023-02-27

## 2023-02-27 NOTE — TELEPHONE ENCOUNTER
TC to provider. Requesting refill on imitrex for migraine headaches, refill on prednisone for asthma flare and z pack for symptoms of URI. Will send rx to pharmacy.   Recommend OV if symptoms persist.

## 2023-06-19 RX ORDER — BUDESONIDE AND FORMOTEROL FUMARATE DIHYDRATE 160; 4.5 UG/1; UG/1
2 AEROSOL RESPIRATORY (INHALATION) 2 TIMES DAILY
Qty: 30.6 G | Refills: 1 | Status: SHIPPED | OUTPATIENT
Start: 2023-06-19

## 2024-01-18 RX ORDER — BUDESONIDE AND FORMOTEROL FUMARATE DIHYDRATE 160; 4.5 UG/1; UG/1
2 AEROSOL RESPIRATORY (INHALATION) 2 TIMES DAILY
Qty: 10.2 EACH | Refills: 6 | Status: SHIPPED | OUTPATIENT
Start: 2024-01-18

## 2024-01-18 NOTE — TELEPHONE ENCOUNTER
PCP: Radha Hills APRN - NP      No future appointments.    Requested Prescriptions     Pending Prescriptions Disp Refills    SYMBICORT 160-4.5 MCG/ACT AERO [Pharmacy Med Name: SYMBICORT 160-4.5 MCG INHALER] 10.2 each 6     Sig: INHALE 2 PUFFS INTO THE LUNGS TWICE A DAY    Llast seen at 04/22/2019

## 2024-08-22 ENCOUNTER — TELEPHONE (OUTPATIENT)
Age: 69
End: 2024-08-22

## 2024-08-22 NOTE — TELEPHONE ENCOUNTER
----- Message from Eliseo JIANG sent at 8/22/2024  2:58 PM EDT -----  Regarding: [facility] Appointment Request  [facility] Appointment Request    Patient needs appointment for [facility] Appointment Type: New to Provider.    Patient Requested Dates(s): As soon as possible  Patient Requested Time: At anytime  Provider Name: Rahat Miller or any other doctor that have an earliest opening    Reason for Appointment Request: New Patient - Available appointments did not meet patient need. PT want to reschedule the appointment that she have on September 23, 2024. She want the earliest appointment the we have even for any other doctor. The patient need to refill her inhaler.  --------------------------------------------------------------------------------------------------------------------------    Relationship to Patient: Self     Call Back Information: OK to leave message on voicemail  Preferred Call Back Number: Phone [phone] (home)

## 2024-10-02 ENCOUNTER — OFFICE VISIT (OUTPATIENT)
Age: 69
End: 2024-10-02

## 2024-10-02 VITALS
WEIGHT: 137.4 LBS | RESPIRATION RATE: 16 BRPM | SYSTOLIC BLOOD PRESSURE: 125 MMHG | BODY MASS INDEX: 25.28 KG/M2 | HEART RATE: 78 BPM | DIASTOLIC BLOOD PRESSURE: 75 MMHG | TEMPERATURE: 97.3 F | HEIGHT: 62 IN | OXYGEN SATURATION: 98 %

## 2024-10-02 DIAGNOSIS — Z91.81 AT HIGH RISK FOR FALLS: ICD-10-CM

## 2024-10-02 DIAGNOSIS — J45.40 MODERATE PERSISTENT ASTHMA WITHOUT COMPLICATION: ICD-10-CM

## 2024-10-02 DIAGNOSIS — G43.009 MIGRAINE WITHOUT AURA AND WITHOUT STATUS MIGRAINOSUS, NOT INTRACTABLE: ICD-10-CM

## 2024-10-02 DIAGNOSIS — R73.03 PREDIABETES: Primary | ICD-10-CM

## 2024-10-02 DIAGNOSIS — E78.00 PURE HYPERCHOLESTEROLEMIA: ICD-10-CM

## 2024-10-02 DIAGNOSIS — R73.03 PREDIABETES: ICD-10-CM

## 2024-10-02 DIAGNOSIS — Z00.00 WELL ADULT EXAM: ICD-10-CM

## 2024-10-02 DIAGNOSIS — Z76.89 ENCOUNTER TO ESTABLISH CARE: ICD-10-CM

## 2024-10-02 PROCEDURE — 99214 OFFICE O/P EST MOD 30 MIN: CPT | Performed by: STUDENT IN AN ORGANIZED HEALTH CARE EDUCATION/TRAINING PROGRAM

## 2024-10-02 RX ORDER — BUDESONIDE AND FORMOTEROL FUMARATE DIHYDRATE 160; 4.5 UG/1; UG/1
2 AEROSOL RESPIRATORY (INHALATION) 2 TIMES DAILY
Qty: 30.6 G | Refills: 3 | Status: SHIPPED | OUTPATIENT
Start: 2024-10-02

## 2024-10-02 RX ORDER — SUMATRIPTAN 100 MG/1
100 TABLET, FILM COATED ORAL DAILY PRN
Qty: 9 TABLET | Refills: 1 | Status: SHIPPED | OUTPATIENT
Start: 2024-10-02

## 2024-10-02 RX ORDER — ALBUTEROL SULFATE 90 UG/1
2 INHALANT RESPIRATORY (INHALATION) EVERY 6 HOURS PRN
Qty: 18 G | Refills: 1 | Status: SHIPPED | OUTPATIENT
Start: 2024-10-02

## 2024-10-02 SDOH — ECONOMIC STABILITY: INCOME INSECURITY: HOW HARD IS IT FOR YOU TO PAY FOR THE VERY BASICS LIKE FOOD, HOUSING, MEDICAL CARE, AND HEATING?: NOT HARD AT ALL

## 2024-10-02 SDOH — ECONOMIC STABILITY: FOOD INSECURITY: WITHIN THE PAST 12 MONTHS, YOU WORRIED THAT YOUR FOOD WOULD RUN OUT BEFORE YOU GOT MONEY TO BUY MORE.: NEVER TRUE

## 2024-10-02 SDOH — ECONOMIC STABILITY: FOOD INSECURITY: WITHIN THE PAST 12 MONTHS, THE FOOD YOU BOUGHT JUST DIDN'T LAST AND YOU DIDN'T HAVE MONEY TO GET MORE.: NEVER TRUE

## 2024-10-02 ASSESSMENT — PATIENT HEALTH QUESTIONNAIRE - PHQ9
SUM OF ALL RESPONSES TO PHQ QUESTIONS 1-9: 0
SUM OF ALL RESPONSES TO PHQ QUESTIONS 1-9: 0
1. LITTLE INTEREST OR PLEASURE IN DOING THINGS: NOT AT ALL
SUM OF ALL RESPONSES TO PHQ QUESTIONS 1-9: 0
SUM OF ALL RESPONSES TO PHQ QUESTIONS 1-9: 0
SUM OF ALL RESPONSES TO PHQ9 QUESTIONS 1 & 2: 0
2. FEELING DOWN, DEPRESSED OR HOPELESS: NOT AT ALL

## 2024-10-02 ASSESSMENT — ENCOUNTER SYMPTOMS
COUGH: 0
SHORTNESS OF BREATH: 0

## 2024-10-02 NOTE — PROGRESS NOTES
RM:9    Chief Complaint   Patient presents with    Establish Care       Fasting No    Vitals:    10/02/24 1044   BP: 125/75   Site: Left Upper Arm   Position: Sitting   Cuff Size: Medium Adult   Pulse: 78   Resp: 16   Temp: 97.3 °F (36.3 °C)   TempSrc: Oral   SpO2: 98%   Weight: 62.3 kg (137 lb 6.4 oz)   Height: 1.575 m (5' 2\")             No data to display                \"Have you been to the ER, urgent care clinic since your last visit?  Hospitalized since your last visit?\"    NO    “Have you seen or consulted any other health care providers outside of [facility] since your last visit?”    NO    Have you had a mammogram?”   NO             “Have you had a colorectal cancer screening such as a colonoscopy/FIT/Cologuard?    NO    Click Here for Release of Records Request   AVS  education, follow up, and recommendations provided and addressed with patient.  services used to advise patient no

## 2024-10-02 NOTE — PROGRESS NOTES
[facility] Pediatrics and Internal Medicine    Assessment and Plan   Taiwo Simmons is a 68 y.o. female who presents for Establish Care     Diagnosis Orders   1. Prediabetes  CBC    Comprehensive Metabolic Panel    Hemoglobin A1C    Lipid Panel      2. Pure hypercholesterolemia  CBC    Comprehensive Metabolic Panel    Hemoglobin A1C    Lipid Panel      3. At high risk for falls        4. Moderate persistent asthma without complication  budesonide-formoterol (SYMBICORT) 160-4.5 MCG/ACT AERO    albuterol sulfate HFA (PROVENTIL;VENTOLIN;PROAIR) 108 (90 Base) MCG/ACT inhaler      5. Migraine without aura and without status migrainosus, not intractable  SUMAtriptan (IMITREX) 100 MG tablet      6. Encounter to establish care        7. Well adult exam           1, 2: Patient is healthy diet and exercise habits, we discussed things further.  Recheck labs today.  3: On the basis of positive falls risk screening, assessment and plan is as follows: patient declines any further evaluation/treatment for increased falls risk.  4: Chronic, well-controlled with current regimen.  Continue Symbicort twice daily, albuterol as needed.  5: Chronic, well-controlled.  Migraines are infrequent, Imitrex helps.  Refilled as needed medication.  6, 7: Patient presents to establish care visit with me.  Acute concerns addressed.  Chronic problems reviewed.  Medications and history reviewed.  See above for additional information.    Click Here for Release of Records Request     Return in about 4 weeks (around 10/30/2024) for Medicare AWV.     Subjective   Taiwo Simmons is a 68 y.o. female who presents for Establish Care    Establish Care  Medical problems include:  Asthma: doing well with Symbicort and Albuterol  Glaucoma: sees OD  Migraines: rare, Imitrex helps  Diet: vegetarian, fairly healthy  Exercise: walks, but trying to go back to gym  Tobacco:  reports that she has never smoked. She has never used smokeless tobacco.

## 2024-10-03 LAB
ALBUMIN SERPL-MCNC: 4 G/DL (ref 3.5–5)
ALBUMIN/GLOB SERPL: 1.4 (ref 1.1–2.2)
ALP SERPL-CCNC: 88 U/L (ref 45–117)
ALT SERPL-CCNC: 54 U/L (ref 12–78)
ANION GAP SERPL CALC-SCNC: 3 MMOL/L (ref 2–12)
AST SERPL-CCNC: 24 U/L (ref 15–37)
BILIRUB SERPL-MCNC: 0.4 MG/DL (ref 0.2–1)
BUN SERPL-MCNC: 14 MG/DL (ref 6–20)
BUN/CREAT SERPL: 26 (ref 12–20)
CALCIUM SERPL-MCNC: 9.8 MG/DL (ref 8.5–10.1)
CHLORIDE SERPL-SCNC: 108 MMOL/L (ref 97–108)
CHOLEST SERPL-MCNC: 181 MG/DL
CO2 SERPL-SCNC: 28 MMOL/L (ref 21–32)
CREAT SERPL-MCNC: 0.53 MG/DL (ref 0.55–1.02)
ERYTHROCYTE [DISTWIDTH] IN BLOOD BY AUTOMATED COUNT: 16.5 % (ref 11.5–14.5)
EST. AVERAGE GLUCOSE BLD GHB EST-MCNC: 128 MG/DL
GLOBULIN SER CALC-MCNC: 2.8 G/DL (ref 2–4)
GLUCOSE SERPL-MCNC: 98 MG/DL (ref 65–100)
HBA1C MFR BLD: 6.1 % (ref 4–5.6)
HCT VFR BLD AUTO: 44.1 % (ref 35–47)
HDLC SERPL-MCNC: 59 MG/DL
HDLC SERPL: 3.1 (ref 0–5)
HGB BLD-MCNC: 13.7 G/DL (ref 11.5–16)
LDLC SERPL CALC-MCNC: 98.8 MG/DL (ref 0–100)
MCH RBC QN AUTO: 26.8 PG (ref 26–34)
MCHC RBC AUTO-ENTMCNC: 31.1 G/DL (ref 30–36.5)
MCV RBC AUTO: 86.3 FL (ref 80–99)
NRBC # BLD: 0 K/UL (ref 0–0.01)
NRBC BLD-RTO: 0 PER 100 WBC
PLATELET # BLD AUTO: 344 K/UL (ref 150–400)
PMV BLD AUTO: 11.3 FL (ref 8.9–12.9)
POTASSIUM SERPL-SCNC: 4.5 MMOL/L (ref 3.5–5.1)
PROT SERPL-MCNC: 6.8 G/DL (ref 6.4–8.2)
RBC # BLD AUTO: 5.11 M/UL (ref 3.8–5.2)
SODIUM SERPL-SCNC: 139 MMOL/L (ref 136–145)
TRIGL SERPL-MCNC: 116 MG/DL
VLDLC SERPL CALC-MCNC: 23.2 MG/DL
WBC # BLD AUTO: 9.5 K/UL (ref 3.6–11)